# Patient Record
Sex: MALE | Race: WHITE | Employment: UNEMPLOYED | URBAN - METROPOLITAN AREA
[De-identification: names, ages, dates, MRNs, and addresses within clinical notes are randomized per-mention and may not be internally consistent; named-entity substitution may affect disease eponyms.]

---

## 2017-01-16 ENCOUNTER — TELEPHONE (OUTPATIENT)
Dept: FAMILY MEDICINE CLINIC | Facility: CLINIC | Age: 62
End: 2017-01-16

## 2017-01-30 RX ORDER — CALCIUM CARBONATE 500(1250)
500 TABLET ORAL EVERY 24 HOURS
Qty: 90 TABLET | Refills: 3 | Status: SHIPPED | OUTPATIENT
Start: 2017-01-30 | End: 2017-02-03

## 2017-01-30 NOTE — TELEPHONE ENCOUNTER
Requesting: Oyster 500mg  LOV: 9/1/16  RTC: n/a  Last Labs: 9/1/16  Filled: 7/8/16 #30 with 1 refills    No future appointments. Medication is not on med list, medication pended if ok to refill. Please advise.

## 2017-02-02 ENCOUNTER — TELEPHONE (OUTPATIENT)
Dept: FAMILY MEDICINE CLINIC | Facility: CLINIC | Age: 62
End: 2017-02-02

## 2017-02-03 ENCOUNTER — OFFICE VISIT (OUTPATIENT)
Dept: FAMILY MEDICINE CLINIC | Facility: CLINIC | Age: 62
End: 2017-02-03

## 2017-02-03 VITALS
RESPIRATION RATE: 16 BRPM | SYSTOLIC BLOOD PRESSURE: 116 MMHG | DIASTOLIC BLOOD PRESSURE: 78 MMHG | TEMPERATURE: 99 F | WEIGHT: 191.19 LBS | HEIGHT: 71 IN | BODY MASS INDEX: 26.77 KG/M2 | HEART RATE: 68 BPM

## 2017-02-03 DIAGNOSIS — R82.90 ABNORMAL URINE ODOR: Primary | ICD-10-CM

## 2017-02-03 DIAGNOSIS — Z20.2 POSSIBLE EXPOSURE TO STD: ICD-10-CM

## 2017-02-03 DIAGNOSIS — R82.998 FROTHY URINE CONSISTENCY: ICD-10-CM

## 2017-02-03 DIAGNOSIS — R53.1 RIGHT SIDED WEAKNESS: ICD-10-CM

## 2017-02-03 DIAGNOSIS — R53.1 WEAKNESS: ICD-10-CM

## 2017-02-03 LAB
APPEARANCE: CLEAR
MULTISTIX LOT#: NORMAL NUMERIC
PH, URINE: 7 (ref 4.5–8)
SPECIFIC GRAVITY: 1.01 (ref 1–1.03)
UROBILINOGEN,SEMI-QN: 0.2 MG/DL (ref 0–1.9)

## 2017-02-03 PROCEDURE — 87491 CHLMYD TRACH DNA AMP PROBE: CPT | Performed by: INTERNAL MEDICINE

## 2017-02-03 PROCEDURE — 87086 URINE CULTURE/COLONY COUNT: CPT | Performed by: INTERNAL MEDICINE

## 2017-02-03 PROCEDURE — 81003 URINALYSIS AUTO W/O SCOPE: CPT | Performed by: INTERNAL MEDICINE

## 2017-02-03 PROCEDURE — 87591 N.GONORRHOEAE DNA AMP PROB: CPT | Performed by: INTERNAL MEDICINE

## 2017-02-03 PROCEDURE — 99213 OFFICE O/P EST LOW 20 MIN: CPT | Performed by: INTERNAL MEDICINE

## 2017-02-03 RX ORDER — CALCIUM CARBONATE 500(1250)
500 TABLET ORAL EVERY 24 HOURS
Qty: 90 TABLET | Refills: 3 | Status: SHIPPED | OUTPATIENT
Start: 2017-02-03 | End: 2017-03-05

## 2017-02-03 NOTE — PROGRESS NOTES
[facility] Internal Medicine Office Note  Chief Complaint:   Patient presents with:  Urine problems      HPI:   This is a 62year old male coming in for urine smelling abnormal - sweet smell for past 3 months.  He has pictures of his urine having mouth daily. Disp: 90 tablet Rfl: 3   Probiotic Product (PROBIOTIC DAILY OR) Take 1 tablet by mouth daily. Disp:  Rfl:    Coenzyme Q10 (CO Q 10 OR) Take 1 tablet by mouth daily.  Disp:  Rfl:    BusPIRone HCl 15 MG Oral Tab Take 1 tablet (15 mg total) by petar any protein or evidence of infection. The picture he showed of his urine in the toilet looked normal. Will send for culture. -     Urine Culture, Routine [E];  Future  -     Urine Dip, auto without Micro    Frothy urine consistence  -     Urine Dip, auto

## 2017-02-05 LAB
C TRACH DNA SPEC QL NAA+PROBE: NEGATIVE
N GONORRHOEA DNA SPEC QL NAA+PROBE: NEGATIVE

## 2017-03-03 ENCOUNTER — APPOINTMENT (OUTPATIENT)
Dept: LAB | Age: 62
End: 2017-03-03
Attending: INTERNAL MEDICINE
Payer: MEDICARE

## 2017-03-03 ENCOUNTER — OFFICE VISIT (OUTPATIENT)
Dept: FAMILY MEDICINE CLINIC | Facility: CLINIC | Age: 62
End: 2017-03-03

## 2017-03-03 VITALS
SYSTOLIC BLOOD PRESSURE: 110 MMHG | TEMPERATURE: 98 F | WEIGHT: 189 LBS | RESPIRATION RATE: 16 BRPM | HEIGHT: 71 IN | HEART RATE: 68 BPM | DIASTOLIC BLOOD PRESSURE: 80 MMHG | BODY MASS INDEX: 26.46 KG/M2

## 2017-03-03 DIAGNOSIS — M79.10 MYALGIA: ICD-10-CM

## 2017-03-03 DIAGNOSIS — G89.29 CHRONIC MIDLINE LOW BACK PAIN WITHOUT SCIATICA: ICD-10-CM

## 2017-03-03 DIAGNOSIS — R21 TARGET RASH: ICD-10-CM

## 2017-03-03 DIAGNOSIS — M54.50 CHRONIC MIDLINE LOW BACK PAIN WITHOUT SCIATICA: ICD-10-CM

## 2017-03-03 DIAGNOSIS — R21 TARGET RASH: Primary | ICD-10-CM

## 2017-03-03 PROCEDURE — 86618 LYME DISEASE ANTIBODY: CPT

## 2017-03-03 PROCEDURE — 36415 COLL VENOUS BLD VENIPUNCTURE: CPT

## 2017-03-03 PROCEDURE — 99213 OFFICE O/P EST LOW 20 MIN: CPT | Performed by: INTERNAL MEDICINE

## 2017-03-03 NOTE — PROGRESS NOTES
[facility] Internal Medicine Office Note  Chief Complaint:   Patient presents with:  Rash: upper left shoulder bulls eye, noticed it 2 days ago, gone now      HPI:   This is a 62year old male coming in for rash on his shoulder that is now [employer] daily. Disp: 90 tablet Rfl: 3   Probiotic Product (PROBIOTIC DAILY OR) Take 1 tablet by mouth daily. Disp:  Rfl:    Coenzyme Q10 (CO Q 10 OR) Take 1 tablet by mouth daily.  Disp:  Rfl:    BusPIRone HCl 15 MG Oral Tab Take 1 tablet (15 mg total) by mouth 2 ( rash.    Diagnoses and all orders for this visit:    Target rash - check lyme Ab; no recent risk factors for tick exposure  -     Lyme Disease, Total Ab W Rflx;  Future    Myalgia - more so on right side; seems related to chronic R hip/knee problems  -

## 2017-03-03 NOTE — PATIENT INSTRUCTIONS
Thank you for choosing Milli Vega MD at [place] [address]  To Do: Shahla Taylor  1. Blood work today - our office will call you with results next week    • Please signup for MY CHART, which is electronic access to your record if you have not done so. at [phone]  Please allow our office 5 business days to contact you regarding any testing results. Refill policies:   Allow 3 business days for refills; controlled substances may take longer and must be picked up from the office in person.   Narcotic

## 2017-03-05 LAB — BORRELIA BURGDORFERI ABS, ELISA: 0.14 LIV

## 2017-03-07 ENCOUNTER — TELEPHONE (OUTPATIENT)
Dept: FAMILY MEDICINE CLINIC | Facility: CLINIC | Age: 62
End: 2017-03-07

## 2017-05-03 ENCOUNTER — TELEPHONE (OUTPATIENT)
Dept: FAMILY MEDICINE CLINIC | Facility: CLINIC | Age: 62
End: 2017-05-03

## 2017-05-03 DIAGNOSIS — S42.309A: Primary | ICD-10-CM

## 2017-05-03 NOTE — TELEPHONE ENCOUNTER
Yun Moraes  [facility] Clinical Staff        Cc: [facility] Referral Pool       Phone Number: [phone]                     Patient Name: Hayden Goldberg   : [birth year]   Reason for the order/referral: Fractured humurus bone & tore pectoral muscle
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
## 2017-05-05 PROBLEM — S42.294A OTHER CLOSED NONDISPLACED FRACTURE OF PROXIMAL END OF RIGHT HUMERUS, INITIAL ENCOUNTER: Status: ACTIVE | Noted: 2017-05-05

## (undated) NOTE — MR AVS SNAPSHOT
[address] [facility] [address]  [address] [place]  [phone]               Thank you for choosing us for your health care visit with Armando Astorga MD.  We are glad to serve you and happy to provide you with read the entire package insert of all medicines prescribed to you and be aware of all of the risks of treatment even beyond those discussed today.  All therapies have potential risk of harm or side effects or medication interactions.  It is your duty and f Take 1 tablet (15 mg total) by mouth 2 (two) times daily. What changed:  additional instructions   Commonly known as:  BUSPAR           CO Q 10 OR   Take 1 tablet by mouth daily.            escitalopram 20 MG Tabs   Take 1 tablet (20 mg total) by mouth ni Get your heart pumping – brisk walking, biking, swimming Even 10 minute increments are effective and add up over the week   2 ½ hours per week – spread out over time Use a tramaine to keep you motivated   Don’t forget strength training with weights and resist

## (undated) NOTE — MR AVS SNAPSHOT
[facility] [address]  [address] [facility] [address]jel[place] Agrawal  [phone]               Thank you for choosing us for your health care visit with Yenni Gomes MD.  We are glad to serve you and happy to provide you with Urine Culture, Routine [E]    Complete by: Feb 03, 2017 (Approximate)    Assoc Dx:  Abnormal urine odor [R82.90]           Urinalysis, Routine [E]    Complete by:   Feb 03, 2017    Assoc Dx:  Abnormal urine odor [R82.90], Frothy urine consistency [R82.99]